# Patient Record
Sex: MALE | Race: WHITE | NOT HISPANIC OR LATINO | Employment: FULL TIME | URBAN - METROPOLITAN AREA
[De-identification: names, ages, dates, MRNs, and addresses within clinical notes are randomized per-mention and may not be internally consistent; named-entity substitution may affect disease eponyms.]

---

## 2017-01-17 ENCOUNTER — HISTORICAL (OUTPATIENT)
Dept: ADMINISTRATIVE | Facility: HOSPITAL | Age: 67
End: 2017-01-17

## 2017-06-27 ENCOUNTER — HISTORICAL (OUTPATIENT)
Dept: ADMINISTRATIVE | Facility: HOSPITAL | Age: 67
End: 2017-06-27

## 2017-12-18 ENCOUNTER — HISTORICAL (OUTPATIENT)
Dept: ADMINISTRATIVE | Facility: HOSPITAL | Age: 67
End: 2017-12-18

## 2018-01-23 LAB — CRC RECOMMENDATION EXT: NORMAL

## 2018-04-10 ENCOUNTER — HISTORICAL (OUTPATIENT)
Dept: ADMINISTRATIVE | Facility: HOSPITAL | Age: 68
End: 2018-04-10

## 2018-04-10 LAB
ABS NEUT (OLG): 3.71 X10(3)/MCL (ref 2.1–9.2)
ALBUMIN SERPL-MCNC: 3.4 GM/DL (ref 3.4–5)
ALBUMIN/GLOB SERPL: 0.8 RATIO (ref 1.1–2)
ALP SERPL-CCNC: 107 UNIT/L (ref 50–136)
ALT SERPL-CCNC: 45 UNIT/L (ref 12–78)
AST SERPL-CCNC: 22 UNIT/L (ref 15–37)
BASOPHILS # BLD AUTO: 0.1 X10(3)/MCL (ref 0–0.2)
BASOPHILS NFR BLD AUTO: 1 %
BILIRUB SERPL-MCNC: 0.4 MG/DL (ref 0.2–1)
BILIRUBIN DIRECT+TOT PNL SERPL-MCNC: 0.1 MG/DL (ref 0–0.5)
BILIRUBIN DIRECT+TOT PNL SERPL-MCNC: 0.3 MG/DL (ref 0–0.8)
BUN SERPL-MCNC: 16 MG/DL (ref 7–18)
CALCIUM SERPL-MCNC: 9.7 MG/DL (ref 8.5–10.1)
CHLORIDE SERPL-SCNC: 106 MMOL/L (ref 98–107)
CHOLEST SERPL-MCNC: 164 MG/DL (ref 0–200)
CHOLEST/HDLC SERPL: 4.7 {RATIO} (ref 0–5)
CO2 SERPL-SCNC: 28 MMOL/L (ref 21–32)
CREAT SERPL-MCNC: 1.03 MG/DL (ref 0.7–1.3)
DEPRECATED CALCIDIOL+CALCIFEROL SERPL-MC: 23 NG/ML (ref 30–80)
EOSINOPHIL # BLD AUTO: 0.6 X10(3)/MCL (ref 0–0.9)
EOSINOPHIL NFR BLD AUTO: 6 %
ERYTHROCYTE [DISTWIDTH] IN BLOOD BY AUTOMATED COUNT: 14.9 % (ref 11.5–17)
EST. AVERAGE GLUCOSE BLD GHB EST-MCNC: 114 MG/DL
GLOBULIN SER-MCNC: 4.4 GM/DL (ref 2.4–3.5)
GLUCOSE SERPL-MCNC: 122 MG/DL (ref 74–106)
HBA1C MFR BLD: 5.6 % (ref 4.2–6.3)
HCT VFR BLD AUTO: 46.2 % (ref 42–52)
HDLC SERPL-MCNC: 35 MG/DL (ref 35–60)
HGB BLD-MCNC: 15 GM/DL (ref 14–18)
LDLC SERPL CALC-MCNC: 101 MG/DL (ref 0–129)
LYMPHOCYTES # BLD AUTO: 4 X10(3)/MCL (ref 0.6–4.6)
LYMPHOCYTES NFR BLD AUTO: 44 %
MCH RBC QN AUTO: 28.6 PG (ref 27–31)
MCHC RBC AUTO-ENTMCNC: 32.5 GM/DL (ref 33–36)
MCV RBC AUTO: 88.2 FL (ref 80–94)
MONOCYTES # BLD AUTO: 0.8 X10(3)/MCL (ref 0.1–1.3)
MONOCYTES NFR BLD AUTO: 9 %
NEUTROPHILS # BLD AUTO: 3.71 X10(3)/MCL (ref 2.1–9.2)
NEUTROPHILS NFR BLD AUTO: 40 %
PLATELET # BLD AUTO: 257 X10(3)/MCL (ref 130–400)
PMV BLD AUTO: 8.9 FL (ref 9.4–12.4)
POTASSIUM SERPL-SCNC: 4.5 MMOL/L (ref 3.5–5.1)
PROT SERPL-MCNC: 7.8 GM/DL (ref 6.4–8.2)
PSA SERPL-MCNC: 0.87 NG/ML (ref 0–4)
RBC # BLD AUTO: 5.24 X10(6)/MCL (ref 4.7–6.1)
SODIUM SERPL-SCNC: 139 MMOL/L (ref 136–145)
TESTOST SERPL-MCNC: 1221 NG/DL
TRIGL SERPL-MCNC: 139 MG/DL (ref 30–150)
VLDLC SERPL CALC-MCNC: 28 MG/DL
WBC # SPEC AUTO: 9.2 X10(3)/MCL (ref 4.5–11.5)

## 2019-05-07 ENCOUNTER — HISTORICAL (OUTPATIENT)
Dept: ADMINISTRATIVE | Facility: HOSPITAL | Age: 69
End: 2019-05-07

## 2019-05-07 LAB
ABS NEUT (OLG): 5.02 X10(3)/MCL (ref 2.1–9.2)
BASOPHILS # BLD AUTO: 0 X10(3)/MCL (ref 0–0.2)
BASOPHILS NFR BLD AUTO: 0 %
BUN SERPL-MCNC: 12 MG/DL (ref 7–18)
CALCIUM SERPL-MCNC: 9.3 MG/DL (ref 8.5–10.1)
CHLORIDE SERPL-SCNC: 104 MMOL/L (ref 98–107)
CHOLEST SERPL-MCNC: 179 MG/DL (ref 0–200)
CHOLEST/HDLC SERPL: 3.5 {RATIO} (ref 0–5)
CO2 SERPL-SCNC: 27 MMOL/L (ref 21–32)
CREAT SERPL-MCNC: 1 MG/DL (ref 0.7–1.3)
CREAT/UREA NIT SERPL: 12
DEPRECATED CALCIDIOL+CALCIFEROL SERPL-MC: 59.31 NG/ML (ref 30–80)
EOSINOPHIL # BLD AUTO: 0.4 X10(3)/MCL (ref 0–0.9)
EOSINOPHIL NFR BLD AUTO: 5 %
ERYTHROCYTE [DISTWIDTH] IN BLOOD BY AUTOMATED COUNT: 14.6 % (ref 11.5–17)
EST. AVERAGE GLUCOSE BLD GHB EST-MCNC: 157 MG/DL
GLUCOSE SERPL-MCNC: 185 MG/DL (ref 74–106)
HBA1C MFR BLD: 7.1 % (ref 4.2–6.3)
HCT VFR BLD AUTO: 46.6 % (ref 42–52)
HDLC SERPL-MCNC: 51 MG/DL (ref 35–60)
HGB BLD-MCNC: 14.9 GM/DL (ref 14–18)
LDLC SERPL CALC-MCNC: 84 MG/DL (ref 0–129)
LYMPHOCYTES # BLD AUTO: 3.1 X10(3)/MCL (ref 0.6–4.6)
LYMPHOCYTES NFR BLD AUTO: 33 %
MCH RBC QN AUTO: 28.2 PG (ref 27–31)
MCHC RBC AUTO-ENTMCNC: 32 GM/DL (ref 33–36)
MCV RBC AUTO: 88.3 FL (ref 80–94)
MONOCYTES # BLD AUTO: 0.8 X10(3)/MCL (ref 0.1–1.3)
MONOCYTES NFR BLD AUTO: 8 %
NEUTROPHILS # BLD AUTO: 5.02 X10(3)/MCL (ref 2.1–9.2)
NEUTROPHILS NFR BLD AUTO: 53 %
PLATELET # BLD AUTO: 295 X10(3)/MCL (ref 130–400)
PMV BLD AUTO: 9 FL (ref 9.4–12.4)
POTASSIUM SERPL-SCNC: 4.3 MMOL/L (ref 3.5–5.1)
PSA SERPL-MCNC: 0.61 NG/ML (ref 0–4)
RBC # BLD AUTO: 5.28 X10(6)/MCL (ref 4.7–6.1)
SODIUM SERPL-SCNC: 139 MMOL/L (ref 136–145)
TESTOST SERPL-MCNC: 333 NG/DL (ref 241–827)
TRIGL SERPL-MCNC: 221 MG/DL (ref 30–150)
URATE SERPL-MCNC: 8 MG/DL (ref 2.6–7.2)
VLDLC SERPL CALC-MCNC: 44 MG/DL
WBC # SPEC AUTO: 9.4 X10(3)/MCL (ref 4.5–11.5)

## 2020-02-10 LAB
LEFT EYE DM RETINOPATHY: NEGATIVE
RIGHT EYE DM RETINOPATHY: NEGATIVE

## 2021-05-26 ENCOUNTER — HISTORICAL (OUTPATIENT)
Dept: ADMINISTRATIVE | Facility: HOSPITAL | Age: 71
End: 2021-05-26

## 2021-05-26 LAB
ALBUMIN SERPL-MCNC: 3.7 GM/DL (ref 3.4–4.8)
ALBUMIN/GLOB SERPL: 0.9 RATIO (ref 1.1–2)
ALP SERPL-CCNC: 102 UNIT/L (ref 40–150)
ALT SERPL-CCNC: 26 UNIT/L (ref 0–55)
AST SERPL-CCNC: 18 UNIT/L (ref 5–34)
BILIRUB SERPL-MCNC: 0.8 MG/DL
BILIRUBIN DIRECT+TOT PNL SERPL-MCNC: 0.3 MG/DL (ref 0–0.5)
BILIRUBIN DIRECT+TOT PNL SERPL-MCNC: 0.5 MG/DL (ref 0–0.8)
BUN SERPL-MCNC: 11 MG/DL (ref 8.4–25.7)
CALCIUM SERPL-MCNC: 10.5 MG/DL (ref 8.8–10)
CHLORIDE SERPL-SCNC: 99 MMOL/L (ref 98–107)
CHOLEST SERPL-MCNC: 168 MG/DL
CHOLEST/HDLC SERPL: 5 {RATIO} (ref 0–5)
CO2 SERPL-SCNC: 27 MMOL/L (ref 23–31)
CREAT SERPL-MCNC: 0.83 MG/DL (ref 0.73–1.18)
DEPRECATED CALCIDIOL+CALCIFEROL SERPL-MC: 18.5 NG/ML (ref 30–80)
ERYTHROCYTE [DISTWIDTH] IN BLOOD BY AUTOMATED COUNT: 15.2 % (ref 11.5–17)
EST. AVERAGE GLUCOSE BLD GHB EST-MCNC: 180 MG/DL
GLOBULIN SER-MCNC: 4 GM/DL (ref 2.4–3.5)
GLUCOSE SERPL-MCNC: 201 MG/DL (ref 82–115)
HBA1C MFR BLD: 7.9 %
HCT VFR BLD AUTO: 46 % (ref 42–52)
HDLC SERPL-MCNC: 37 MG/DL (ref 35–60)
HGB BLD-MCNC: 15.1 GM/DL (ref 14–18)
LDLC SERPL CALC-MCNC: 83 MG/DL (ref 50–140)
MCH RBC QN AUTO: 28.3 PG (ref 27–31)
MCHC RBC AUTO-ENTMCNC: 32.8 GM/DL (ref 33–36)
MCV RBC AUTO: 86.1 FL (ref 80–94)
PLATELET # BLD AUTO: 309 X10(3)/MCL (ref 130–400)
PMV BLD AUTO: 9.1 FL (ref 9.4–12.4)
POTASSIUM SERPL-SCNC: 4.3 MMOL/L (ref 3.5–5.1)
PROT SERPL-MCNC: 7.7 GM/DL (ref 5.8–7.6)
RBC # BLD AUTO: 5.34 X10(6)/MCL (ref 4.7–6.1)
SODIUM SERPL-SCNC: 141 MMOL/L (ref 136–145)
T3RU NFR SERPL: 30 % (ref 31–39)
T4 SERPL-MCNC: 8.55 UG/DL (ref 4.87–11.72)
TRIGL SERPL-MCNC: 242 MG/DL (ref 34–140)
TSH SERPL-ACNC: 0.97 UIU/ML (ref 0.35–4.94)
VIT B12 SERPL-MCNC: 494 PG/ML (ref 213–816)
VLDLC SERPL CALC-MCNC: 48 MG/DL
WBC # SPEC AUTO: 8.7 X10(3)/MCL (ref 4.5–11.5)

## 2021-07-09 ENCOUNTER — HISTORICAL (OUTPATIENT)
Dept: ADMINISTRATIVE | Facility: HOSPITAL | Age: 71
End: 2021-07-09

## 2021-07-09 LAB
ABS NEUT (OLG): 4.31 X10(3)/MCL (ref 2.1–9.2)
ALBUMIN SERPL-MCNC: 3.4 GM/DL (ref 3.4–4.8)
ALBUMIN/GLOB SERPL: 0.8 RATIO (ref 1.1–2)
ALP SERPL-CCNC: 96 UNIT/L (ref 40–150)
ALT SERPL-CCNC: 34 UNIT/L (ref 0–55)
AMYLASE SERPL-CCNC: 43 UNIT/L (ref 20–160)
APPEARANCE, UA: CLEAR
AST SERPL-CCNC: 28 UNIT/L (ref 5–34)
BACTERIA SPEC CULT: ABNORMAL /HPF
BASOPHILS # BLD AUTO: 0.1 X10(3)/MCL (ref 0–0.2)
BASOPHILS NFR BLD AUTO: 1 %
BILIRUB SERPL-MCNC: 0.7 MG/DL
BILIRUB UR QL STRIP: NEGATIVE
BILIRUBIN DIRECT+TOT PNL SERPL-MCNC: 0.3 MG/DL (ref 0–0.5)
BILIRUBIN DIRECT+TOT PNL SERPL-MCNC: 0.4 MG/DL (ref 0–0.8)
BUN SERPL-MCNC: 8.5 MG/DL (ref 8.4–25.7)
CALCIUM SERPL-MCNC: 9.3 MG/DL (ref 8.8–10)
CHLORIDE SERPL-SCNC: 105 MMOL/L (ref 98–107)
CO2 SERPL-SCNC: 23 MMOL/L (ref 23–31)
COLOR UR: YELLOW
CREAT SERPL-MCNC: 0.82 MG/DL (ref 0.73–1.18)
EOSINOPHIL # BLD AUTO: 0.4 X10(3)/MCL (ref 0–0.9)
EOSINOPHIL NFR BLD AUTO: 4 %
ERYTHROCYTE [DISTWIDTH] IN BLOOD BY AUTOMATED COUNT: 14 % (ref 11.5–17)
EST. AVERAGE GLUCOSE BLD GHB EST-MCNC: 205.9 MG/DL
GLOBULIN SER-MCNC: 4.2 GM/DL (ref 2.4–3.5)
GLUCOSE (UA): ABNORMAL
GLUCOSE SERPL-MCNC: 187 MG/DL (ref 82–115)
HBA1C MFR BLD: 8.8 %
HCT VFR BLD AUTO: 42.4 % (ref 42–52)
HGB BLD-MCNC: 13.7 GM/DL (ref 14–18)
HGB UR QL STRIP: NEGATIVE
KETONES UR QL STRIP: NEGATIVE
LEUKOCYTE ESTERASE UR QL STRIP: NEGATIVE
LYMPHOCYTES # BLD AUTO: 4.4 X10(3)/MCL (ref 0.6–4.6)
LYMPHOCYTES NFR BLD AUTO: 44 %
MCH RBC QN AUTO: 27.5 PG (ref 27–31)
MCHC RBC AUTO-ENTMCNC: 32.3 GM/DL (ref 33–36)
MCV RBC AUTO: 85 FL (ref 80–94)
MONOCYTES # BLD AUTO: 0.7 X10(3)/MCL (ref 0.1–1.3)
MONOCYTES NFR BLD AUTO: 7 %
NEUTROPHILS # BLD AUTO: 4.31 X10(3)/MCL (ref 2.1–9.2)
NEUTROPHILS NFR BLD AUTO: 44 %
NITRITE UR QL STRIP: NEGATIVE
PH UR STRIP: 5.5 [PH] (ref 5–9)
PLATELET # BLD AUTO: 286 X10(3)/MCL (ref 130–400)
PMV BLD AUTO: 9.4 FL (ref 9.4–12.4)
POTASSIUM SERPL-SCNC: 4 MMOL/L (ref 3.5–5.1)
PROT SERPL-MCNC: 7.6 GM/DL (ref 5.8–7.6)
PROT UR QL STRIP: ABNORMAL
RBC # BLD AUTO: 4.99 X10(6)/MCL (ref 4.7–6.1)
RBC #/AREA URNS HPF: ABNORMAL /[HPF]
SODIUM SERPL-SCNC: 141 MMOL/L (ref 136–145)
SP GR UR STRIP: 1.02 (ref 1–1.03)
SQUAMOUS EPITHELIAL, UA: ABNORMAL /HPF
UA WBC MAN: ABNORMAL
UROBILINOGEN UR STRIP-ACNC: 0.2
WBC # SPEC AUTO: 9.9 X10(3)/MCL (ref 4.5–11.5)

## 2022-08-02 DIAGNOSIS — R31.9 HEMATURIA SYNDROME: Primary | ICD-10-CM

## 2022-08-05 ENCOUNTER — HOSPITAL ENCOUNTER (OUTPATIENT)
Dept: RADIOLOGY | Facility: HOSPITAL | Age: 72
Discharge: HOME | End: 2022-08-05
Attending: UROLOGY
Payer: COMMERCIAL

## 2022-08-05 DIAGNOSIS — R31.9 HEMATURIA SYNDROME: ICD-10-CM

## 2022-08-05 LAB
CREAT SERPL-MCNC: 0.7 MG/DL (ref 0.5–1.4)
SAMPLE: NORMAL

## 2022-08-05 PROCEDURE — 25500020 PHARM REV CODE 255: Performed by: UROLOGY

## 2022-08-05 PROCEDURE — 74178 CT ABD&PLV WO CNTR FLWD CNTR: CPT | Mod: TC

## 2022-08-05 RX ADMIN — IOPAMIDOL 100 ML: 755 INJECTION, SOLUTION INTRAVENOUS at 11:08

## 2022-08-25 RX ORDER — ENALAPRIL MALEATE 10 MG/1
TABLET ORAL
Qty: 90 TABLET | Refills: 3 | Status: SHIPPED | OUTPATIENT
Start: 2022-08-25

## 2022-11-17 ENCOUNTER — TELEPHONE (OUTPATIENT)
Dept: INTERNAL MEDICINE | Facility: CLINIC | Age: 72
End: 2022-11-17
Payer: COMMERCIAL

## 2022-12-20 ENCOUNTER — DOCUMENTATION ONLY (OUTPATIENT)
Dept: ADMINISTRATIVE | Facility: HOSPITAL | Age: 72
End: 2022-12-20
Payer: COMMERCIAL

## 2022-12-27 ENCOUNTER — DOCUMENTATION ONLY (OUTPATIENT)
Dept: ADMINISTRATIVE | Facility: HOSPITAL | Age: 72
End: 2022-12-27
Payer: COMMERCIAL

## 2023-02-14 LAB — CRC RECOMMENDATION EXT: NORMAL

## 2023-03-21 ENCOUNTER — PATIENT OUTREACH (OUTPATIENT)
Dept: ADMINISTRATIVE | Facility: HOSPITAL | Age: 73
End: 2023-03-21
Payer: COMMERCIAL

## 2023-03-21 NOTE — PROGRESS NOTES
Population Health Outreach.   The following record(s)  below were uploaded for Health Maintenance .      COLONOSCOPY     02/14/23

## 2023-05-31 ENCOUNTER — PATIENT OUTREACH (OUTPATIENT)
Dept: ADMINISTRATIVE | Facility: HOSPITAL | Age: 73
End: 2023-05-31
Payer: COMMERCIAL

## 2023-05-31 NOTE — PROGRESS NOTES
Population Health Outreach.    Last eye exam on file is from 2020,called patient to discuss- no answer/left voicemail.

## 2023-08-04 ENCOUNTER — TELEPHONE (OUTPATIENT)
Dept: INTERNAL MEDICINE | Facility: CLINIC | Age: 73
End: 2023-08-04
Payer: COMMERCIAL

## 2023-09-05 ENCOUNTER — HOSPITAL ENCOUNTER (INPATIENT)
Facility: HOSPITAL | Age: 73
LOS: 1 days | Discharge: HOME | DRG: 694 | End: 2023-09-06
Attending: EMERGENCY MEDICINE | Admitting: INTERNAL MEDICINE
Payer: COMMERCIAL

## 2023-09-05 DIAGNOSIS — N20.1 URETEROLITHIASIS: Primary | ICD-10-CM

## 2023-09-05 DIAGNOSIS — R50.9 FEVER, UNSPECIFIED FEVER CAUSE: ICD-10-CM

## 2023-09-05 LAB
ALBUMIN SERPL-MCNC: 3.7 G/DL (ref 3.4–4.8)
ALBUMIN/GLOB SERPL: 0.8 RATIO (ref 1.1–2)
ALP SERPL-CCNC: 104 UNIT/L (ref 40–150)
ALT SERPL-CCNC: 28 UNIT/L (ref 0–55)
APPEARANCE UR: CLEAR
AST SERPL-CCNC: 18 UNIT/L (ref 5–34)
BACTERIA #/AREA URNS AUTO: ABNORMAL /HPF
BASOPHILS # BLD AUTO: 0.09 X10(3)/MCL
BASOPHILS NFR BLD AUTO: 0.6 %
BILIRUB SERPL-MCNC: 0.4 MG/DL
BILIRUB UR QL STRIP.AUTO: NEGATIVE
BUN SERPL-MCNC: 22.7 MG/DL (ref 8.4–25.7)
CALCIUM SERPL-MCNC: 9.7 MG/DL (ref 8.8–10)
CHLORIDE SERPL-SCNC: 103 MMOL/L (ref 98–107)
CO2 SERPL-SCNC: 24 MMOL/L (ref 23–31)
COLOR UR: YELLOW
CREAT SERPL-MCNC: 1.41 MG/DL (ref 0.73–1.18)
EOSINOPHIL # BLD AUTO: 0.15 X10(3)/MCL (ref 0–0.9)
EOSINOPHIL NFR BLD AUTO: 1 %
ERYTHROCYTE [DISTWIDTH] IN BLOOD BY AUTOMATED COUNT: 13.7 % (ref 11.5–17)
GFR SERPLBLD CREATININE-BSD FMLA CKD-EPI: 52 MLS/MIN/1.73/M2
GLOBULIN SER-MCNC: 4.4 GM/DL (ref 2.4–3.5)
GLUCOSE SERPL-MCNC: 347 MG/DL (ref 82–115)
GLUCOSE UR QL STRIP.AUTO: ABNORMAL
HCT VFR BLD AUTO: 45.1 % (ref 42–52)
HGB BLD-MCNC: 15 G/DL (ref 14–18)
IMM GRANULOCYTES # BLD AUTO: 0.05 X10(3)/MCL (ref 0–0.04)
IMM GRANULOCYTES NFR BLD AUTO: 0.3 %
KETONES UR QL STRIP.AUTO: NEGATIVE
LEUKOCYTE ESTERASE UR QL STRIP.AUTO: NEGATIVE
LIPASE SERPL-CCNC: 20 U/L
LYMPHOCYTES # BLD AUTO: 2.13 X10(3)/MCL (ref 0.6–4.6)
LYMPHOCYTES NFR BLD AUTO: 14.3 %
MCH RBC QN AUTO: 26.9 PG (ref 27–31)
MCHC RBC AUTO-ENTMCNC: 33.3 G/DL (ref 33–36)
MCV RBC AUTO: 81 FL (ref 80–94)
MONOCYTES # BLD AUTO: 0.88 X10(3)/MCL (ref 0.1–1.3)
MONOCYTES NFR BLD AUTO: 5.9 %
NEUTROPHILS # BLD AUTO: 11.56 X10(3)/MCL (ref 2.1–9.2)
NEUTROPHILS NFR BLD AUTO: 77.9 %
NITRITE UR QL STRIP.AUTO: NEGATIVE
NRBC BLD AUTO-RTO: 0 %
PH UR STRIP.AUTO: 5.5 [PH]
PLATELET # BLD AUTO: 311 X10(3)/MCL (ref 130–400)
PMV BLD AUTO: 8.7 FL (ref 7.4–10.4)
POCT GLUCOSE: 203 MG/DL (ref 70–110)
POTASSIUM SERPL-SCNC: 4.6 MMOL/L (ref 3.5–5.1)
PROT SERPL-MCNC: 8.1 GM/DL (ref 5.8–7.6)
PROT UR QL STRIP.AUTO: ABNORMAL
RBC # BLD AUTO: 5.57 X10(6)/MCL (ref 4.7–6.1)
RBC #/AREA URNS AUTO: ABNORMAL /HPF
RBC UR QL AUTO: ABNORMAL
SARS-COV-2 RDRP RESP QL NAA+PROBE: NEGATIVE
SODIUM SERPL-SCNC: 136 MMOL/L (ref 136–145)
SP GR UR STRIP.AUTO: 1.02 (ref 1–1.03)
SQUAMOUS #/AREA URNS AUTO: ABNORMAL /HPF
UROBILINOGEN UR STRIP-ACNC: 0.2
WBC # SPEC AUTO: 14.86 X10(3)/MCL (ref 4.5–11.5)
WBC #/AREA URNS AUTO: ABNORMAL /HPF

## 2023-09-05 PROCEDURE — 87040 BLOOD CULTURE FOR BACTERIA: CPT

## 2023-09-05 PROCEDURE — 25000003 PHARM REV CODE 250

## 2023-09-05 PROCEDURE — 63600175 PHARM REV CODE 636 W HCPCS: Performed by: EMERGENCY MEDICINE

## 2023-09-05 PROCEDURE — 63600175 PHARM REV CODE 636 W HCPCS

## 2023-09-05 PROCEDURE — 96361 HYDRATE IV INFUSION ADD-ON: CPT

## 2023-09-05 PROCEDURE — 96375 TX/PRO/DX INJ NEW DRUG ADDON: CPT

## 2023-09-05 PROCEDURE — 96374 THER/PROPH/DIAG INJ IV PUSH: CPT

## 2023-09-05 PROCEDURE — 25000003 PHARM REV CODE 250: Performed by: EMERGENCY MEDICINE

## 2023-09-05 PROCEDURE — 83690 ASSAY OF LIPASE: CPT | Performed by: EMERGENCY MEDICINE

## 2023-09-05 PROCEDURE — 85025 COMPLETE CBC W/AUTO DIFF WBC: CPT | Performed by: STUDENT IN AN ORGANIZED HEALTH CARE EDUCATION/TRAINING PROGRAM

## 2023-09-05 PROCEDURE — 81001 URINALYSIS AUTO W/SCOPE: CPT | Performed by: STUDENT IN AN ORGANIZED HEALTH CARE EDUCATION/TRAINING PROGRAM

## 2023-09-05 PROCEDURE — 87088 URINE BACTERIA CULTURE: CPT | Performed by: NURSE PRACTITIONER

## 2023-09-05 PROCEDURE — 87635 SARS-COV-2 COVID-19 AMP PRB: CPT | Performed by: EMERGENCY MEDICINE

## 2023-09-05 PROCEDURE — 80053 COMPREHEN METABOLIC PANEL: CPT | Performed by: STUDENT IN AN ORGANIZED HEALTH CARE EDUCATION/TRAINING PROGRAM

## 2023-09-05 PROCEDURE — 11000001 HC ACUTE MED/SURG PRIVATE ROOM

## 2023-09-05 PROCEDURE — 99285 EMERGENCY DEPT VISIT HI MDM: CPT | Mod: 25

## 2023-09-05 RX ORDER — MIDAZOLAM HYDROCHLORIDE 1 MG/ML
2 INJECTION INTRAMUSCULAR; INTRAVENOUS ONCE AS NEEDED
Status: CANCELLED | OUTPATIENT
Start: 2023-09-05 | End: 2035-02-01

## 2023-09-05 RX ORDER — LAMOTRIGINE 100 MG/1
100 TABLET ORAL DAILY
Status: DISCONTINUED | OUTPATIENT
Start: 2023-09-05 | End: 2023-09-06 | Stop reason: HOSPADM

## 2023-09-05 RX ORDER — METFORMIN HYDROCHLORIDE 500 MG/1
500 TABLET ORAL 2 TIMES DAILY WITH MEALS
COMMUNITY

## 2023-09-05 RX ORDER — INSULIN ASPART 100 [IU]/ML
0-5 INJECTION, SOLUTION INTRAVENOUS; SUBCUTANEOUS
Status: DISCONTINUED | OUTPATIENT
Start: 2023-09-05 | End: 2023-09-06 | Stop reason: HOSPADM

## 2023-09-05 RX ORDER — GLUCAGON 1 MG
1 KIT INJECTION
Status: DISCONTINUED | OUTPATIENT
Start: 2023-09-05 | End: 2023-09-06 | Stop reason: HOSPADM

## 2023-09-05 RX ORDER — HYDROCODONE BITARTRATE AND ACETAMINOPHEN 5; 325 MG/1; MG/1
1 TABLET ORAL
Status: CANCELLED | OUTPATIENT
Start: 2023-09-05

## 2023-09-05 RX ORDER — ROSUVASTATIN CALCIUM 20 MG/1
20 TABLET, COATED ORAL DAILY
COMMUNITY

## 2023-09-05 RX ORDER — OLANZAPINE 5 MG/1
5 TABLET ORAL
COMMUNITY
Start: 2023-08-18

## 2023-09-05 RX ORDER — ONDANSETRON 2 MG/ML
4 INJECTION INTRAMUSCULAR; INTRAVENOUS
Status: COMPLETED | OUTPATIENT
Start: 2023-09-05 | End: 2023-09-05

## 2023-09-05 RX ORDER — ASPIRIN 81 MG/1
81 TABLET ORAL DAILY
COMMUNITY

## 2023-09-05 RX ORDER — PROCHLORPERAZINE EDISYLATE 5 MG/ML
5 INJECTION INTRAMUSCULAR; INTRAVENOUS EVERY 30 MIN PRN
Status: CANCELLED | OUTPATIENT
Start: 2023-09-05

## 2023-09-05 RX ORDER — ONDANSETRON 2 MG/ML
4 INJECTION INTRAMUSCULAR; INTRAVENOUS DAILY PRN
Status: CANCELLED | OUTPATIENT
Start: 2023-09-05

## 2023-09-05 RX ORDER — ONDANSETRON 2 MG/ML
4 INJECTION INTRAMUSCULAR; INTRAVENOUS EVERY 8 HOURS PRN
Status: DISCONTINUED | OUTPATIENT
Start: 2023-09-05 | End: 2023-09-06 | Stop reason: HOSPADM

## 2023-09-05 RX ORDER — SODIUM CHLORIDE 9 MG/ML
1000 INJECTION, SOLUTION INTRAVENOUS
Status: COMPLETED | OUTPATIENT
Start: 2023-09-05 | End: 2023-09-05

## 2023-09-05 RX ORDER — SODIUM CHLORIDE, SODIUM GLUCONATE, SODIUM ACETATE, POTASSIUM CHLORIDE AND MAGNESIUM CHLORIDE 30; 37; 368; 526; 502 MG/100ML; MG/100ML; MG/100ML; MG/100ML; MG/100ML
INJECTION, SOLUTION INTRAVENOUS CONTINUOUS
Status: CANCELLED | OUTPATIENT
Start: 2023-09-05 | End: 2023-10-05

## 2023-09-05 RX ORDER — KETOROLAC TROMETHAMINE 30 MG/ML
15 INJECTION, SOLUTION INTRAMUSCULAR; INTRAVENOUS
Status: COMPLETED | OUTPATIENT
Start: 2023-09-05 | End: 2023-09-05

## 2023-09-05 RX ORDER — GLIPIZIDE 10 MG/1
10 TABLET ORAL
COMMUNITY

## 2023-09-05 RX ORDER — HYDROMORPHONE HYDROCHLORIDE 2 MG/ML
0.4 INJECTION, SOLUTION INTRAMUSCULAR; INTRAVENOUS; SUBCUTANEOUS EVERY 5 MIN PRN
Status: CANCELLED | OUTPATIENT
Start: 2023-09-05

## 2023-09-05 RX ORDER — LAMOTRIGINE 100 MG/1
100 TABLET ORAL DAILY
COMMUNITY
Start: 2023-07-07

## 2023-09-05 RX ORDER — SODIUM CHLORIDE, SODIUM LACTATE, POTASSIUM CHLORIDE, CALCIUM CHLORIDE 600; 310; 30; 20 MG/100ML; MG/100ML; MG/100ML; MG/100ML
INJECTION, SOLUTION INTRAVENOUS CONTINUOUS
Status: CANCELLED | OUTPATIENT
Start: 2023-09-05

## 2023-09-05 RX ORDER — IBUPROFEN 200 MG
16 TABLET ORAL
Status: DISCONTINUED | OUTPATIENT
Start: 2023-09-05 | End: 2023-09-06 | Stop reason: HOSPADM

## 2023-09-05 RX ORDER — FOLIC ACID 1 MG/1
1 TABLET ORAL DAILY
COMMUNITY

## 2023-09-05 RX ORDER — OLANZAPINE 5 MG/1
5 TABLET ORAL DAILY
Status: DISCONTINUED | OUTPATIENT
Start: 2023-09-05 | End: 2023-09-06 | Stop reason: HOSPADM

## 2023-09-05 RX ORDER — MEPERIDINE HYDROCHLORIDE 25 MG/ML
6.25 INJECTION INTRAMUSCULAR; INTRAVENOUS; SUBCUTANEOUS ONCE AS NEEDED
Status: CANCELLED | OUTPATIENT
Start: 2023-09-05 | End: 2023-09-06

## 2023-09-05 RX ORDER — ACETAMINOPHEN 325 MG/1
650 TABLET ORAL EVERY 4 HOURS PRN
Status: DISCONTINUED | OUTPATIENT
Start: 2023-09-05 | End: 2023-09-06 | Stop reason: HOSPADM

## 2023-09-05 RX ORDER — ACETAMINOPHEN 325 MG/1
650 TABLET ORAL EVERY 8 HOURS PRN
Status: DISCONTINUED | OUTPATIENT
Start: 2023-09-05 | End: 2023-09-06 | Stop reason: HOSPADM

## 2023-09-05 RX ORDER — IBUPROFEN 200 MG
24 TABLET ORAL
Status: DISCONTINUED | OUTPATIENT
Start: 2023-09-05 | End: 2023-09-06 | Stop reason: HOSPADM

## 2023-09-05 RX ORDER — MORPHINE SULFATE 4 MG/ML
4 INJECTION, SOLUTION INTRAMUSCULAR; INTRAVENOUS
Status: COMPLETED | OUTPATIENT
Start: 2023-09-05 | End: 2023-09-05

## 2023-09-05 RX ORDER — OXYCODONE HYDROCHLORIDE 5 MG/1
5 TABLET ORAL EVERY 4 HOURS PRN
Status: CANCELLED | OUTPATIENT
Start: 2023-09-05

## 2023-09-05 RX ADMIN — OLANZAPINE 5 MG: 5 TABLET, FILM COATED ORAL at 01:09

## 2023-09-05 RX ADMIN — ONDANSETRON 4 MG: 2 INJECTION INTRAMUSCULAR; INTRAVENOUS at 08:09

## 2023-09-05 RX ADMIN — CEFTRIAXONE SODIUM 1 G: 1 INJECTION, POWDER, FOR SOLUTION INTRAMUSCULAR; INTRAVENOUS at 09:09

## 2023-09-05 RX ADMIN — LAMOTRIGINE 100 MG: 100 TABLET ORAL at 01:09

## 2023-09-05 RX ADMIN — INSULIN ASPART 3 UNITS: 100 INJECTION, SOLUTION INTRAVENOUS; SUBCUTANEOUS at 02:09

## 2023-09-05 RX ADMIN — KETOROLAC TROMETHAMINE 15 MG: 30 INJECTION, SOLUTION INTRAMUSCULAR; INTRAVENOUS at 08:09

## 2023-09-05 RX ADMIN — MORPHINE SULFATE 4 MG: 4 INJECTION, SOLUTION INTRAMUSCULAR; INTRAVENOUS at 08:09

## 2023-09-05 RX ADMIN — SODIUM CHLORIDE 1000 ML: 9 INJECTION, SOLUTION INTRAVENOUS at 08:09

## 2023-09-05 RX ADMIN — SODIUM CHLORIDE 1000 ML: 9 INJECTION, SOLUTION INTRAVENOUS at 10:09

## 2023-09-05 RX ADMIN — INSULIN HUMAN 10 UNITS: 100 INJECTION, SOLUTION PARENTERAL at 09:09

## 2023-09-05 NOTE — H&P
"[facility] MEDICINE - H&P ADMISSION NOTE      Patient Name: Sam Strong IV  MRN: [id number]  Patient Class: IP- Inpatient   Admission Date: 9/5/2023   Admitting Physician:  Service   Attending Physician: Sushant Garibay MD  Primary Care Provider: Jai Ren MD  Face-to-Face encounter date: 09/05/2023        CHIEF COMPLAINT     Chief Complaint   Patient presents with    Back Pain    Vomiting     Left lower back pain for mos, worse last night. had mri done that showed " a bulge", referred to Dr. Frias and scheduled to see on 9/27. Denies recent trauma/incontinence. @ episode of vomiting earlier       HISTORY OF PRESENTING ILLNESS   73-year-old male with a past medical history of HLD, HTN, dm 2, bipolar disorder, mild cognitive impairment versus Alzheimer dementia.  Coming in with complaints of left lower quadrant abdominal pain/flank pain he did have fever when he came in but did not feel that he was febrile.  Nausea and vomiting once.  Urology was consulted and planning for ureteral stent today due to CT abdomen pelvis noting mm stone at the left UVJ producing left mild hydronephrosis and hydroureter.        PAST MEDICAL HISTORY     Past Medical History:   Diagnosis Date    Personal history of colonic polyps        PAST SURGICAL HISTORY     Past Surgical History:   Procedure Laterality Date    COLONOSCOPY  01/23/2018    COLONOSCOPY  12/30/2014    COLONOSCOPY  02/14/2023       FAMILY HISTORY   Reviewed and noncontributory to this case    SOCIAL HISTORY     Social History     Socioeconomic History    Marital status:        HOME MEDICATIONS     Prior to Admission medications    Medication Sig Start Date End Date Taking? Authorizing Provider   allopurinoL (ZYLOPRIM) 300 MG tablet TAKE 1 TABLET BY MOUTH EVERY DAY 1/31/23   Jai Ren MD   enalapril (VASOTEC) 10 MG tablet TAKE 1 TABLET BY MOUTH EVERY DAY 8/25/22   Jai Ren MD   lamoTRIgine " (LAMICTAL) 100 MG tablet Take 100 mg by mouth once daily. 7/7/23   Provider, Historical   OLANZapine (ZYPREXA) 5 MG tablet Take 5 mg by mouth. 8/18/23   Provider, Historical       ALLERGIES   Iodine          REVIEW OF SYSTEMS   Except as documented above, all other systems reviewed and negative    PHYSICAL EXAM     Vitals:    09/05/23 1110   BP: 122/84   Pulse: 93   Resp:    Temp:       General:  In no acute distress, resting comfortably  Head and neck:  Atraumatic, normocephalic, moist mucous membranes, supple neck  Chest:  Clear to auscultation bilaterally  Heart:  S1, S2, no appreciable murmur  Abdomen:  Soft, nontender, BS +  MSK:  Warm, no lower extremity edema, no clubbing or cyanosis  Neuro:  Alert and oriented x4, moving all extremities with good strength  Integumentary:  No obvious skin rash  Psychiatry:  Appropriate mood and affect          ASSESSMENT AND PLAN   Left UVJ obstructing ureterolithiasis  Acute complicated urinary tract infection   Sepsis 2/2 above   Acute kidney injury versus baseline CKD     History of: As listed above      Urology consulted and planning for stent placement today.    Urine to send for culture.  Continue Rocephin 1 g daily.      DVT prophylaxis:    __________________________________________________________________  LABS/MICRO/MEDS/DIAGNOSTICS       LABS  Recent Labs     09/05/23  0802      K 4.6   CHLORIDE 103   CO2 24   BUN 22.7   CREATININE 1.41*   GLUCOSE 347*   CALCIUM 9.7   ALKPHOS 104   AST 18   ALT 28   ALBUMIN 3.7     Recent Labs     09/05/23  0802   WBC 14.86*   RBC 5.57   HCT 45.1   MCV 81.0          MICROBIOLOGY  Microbiology Results (last 7 days)       Procedure Component Value Units Date/Time    Blood Culture [[id number]]     Order Status: Sent Specimen: Blood     Blood Culture [[id number]]     Order Status: Sent Specimen: Blood     Urine Culture High Risk [[id number]]     Order Status: Sent Specimen: Urine, Clean Catch             MEDICATIONS    [START ON 9/6/2023] cefTRIAXone (ROCEPHIN) IVPB  1 g Intravenous Q24H    lamoTRIgine  100 mg Oral Daily    OLANZapine  5 mg Oral Daily      INFUSIONS      DIAGNOSTIC TESTS  X-Ray Abdomen Flat And Erect   Final Result      No stone is identified in the left hemipelvis radiographically         Electronically signed by: Jose J Goddard MD   Date:    09/05/2023   Time:    11:30      CT Abdomen Pelvis  Without Contrast   Final Result      4 mm stone at the left UVJ producing mild left hydronephrosis and hydroureter.         Electronically signed by: Eugene Sidhu   Date:    09/05/2023   Time:    08:50             Patient information was obtained from patient, patient's family, past medical records and ER records.   All diagnosis and differential diagnosis have been reviewed; assessment and plan has been documented. I have personally reviewed the labs and test results that are presently available; I have reviewed the patients medication list. I have reviewed the consulting providers response and recommendations. I have reviewed or attempted to review medical records based upon their availability.  All of the patient's questions have been addressed and answered. Patient's is agreeable to the above stated plan. I will continue to monitor closely and make adjustments to medical management as needed.  This note was created using [employer] [place] voice recognition software that occasionally misinterpreted phrases or words.  Please contact me if any questions may rise regarding documentation to clarify verbiage.        Sushant Garibay MD   Internal Medicine  Department of [facility]  [facility] - Emergency Dept

## 2023-09-05 NOTE — CONSULTS
Sam Strong IV 1950  [id number]  9/5/2023    CONSULTING PHYSICIAN: [place]    Reason for consult: Ureteral stone, fever    HPI: Mr. Strong is a 72 yo male with a PMH of DM and chronic back pain presented to the ED with complaints of worsening lower back pain.  Denies fever, chills, gross hematuria, dysuria, nausea or vomiting.  He did have a temp of 100.7° on arrival.  Labs on arrival include WBC 14.86, BUN/creat 22.7 & 1.41, UA with no WBC or bacteria. Abd/pelvic CT revealed a 4 mm stone at the left UVJ with mild left hydronephrosis and hydroureter. He is a patient of Dr. Baljeet Dai.      Past Medical History:   Diagnosis Date    Personal history of colonic polyps      Past Surgical History:   Procedure Laterality Date    COLONOSCOPY  01/23/2018    COLONOSCOPY  12/30/2014    COLONOSCOPY  02/14/2023     No family history on file.    Current Facility-Administered Medications   Medication Dose Route Frequency Provider Last Rate Last Admin    0.9%  NaCl infusion  1,000 mL Intravenous ED 1 Time Jai Koch MD        cefTRIAXone (ROCEPHIN) 1 g in dextrose 5 % in water (D5W) 100 mL IVPB (MB+)  1 g Intravenous ED 1 Time Jai Koch  mL/hr at 09/05/23 0918 1 g at 09/05/23 0918     Current Outpatient Medications   Medication Sig Dispense Refill    allopurinoL (ZYLOPRIM) 300 MG tablet TAKE 1 TABLET BY MOUTH EVERY DAY 30 tablet 0    enalapril (VASOTEC) 10 MG tablet TAKE 1 TABLET BY MOUTH EVERY DAY 90 tablet 3     Review of patient's allergies indicates:   Allergen Reactions    Iodine      ROS: 12 point review of systems negative other than the HPI    PHYSICAL EXAM:  Vitals:    09/05/23 0750 09/05/23 0815   BP: (!) 175/95    BP Location: Left arm    Patient Position: Sitting    Pulse: 91    Resp: 18 20   Temp: (!) 100.7 °F (38.2 °C)    TempSrc: Oral    SpO2: 98%      No intake or output data in the 24 hours ending 09/05/23 0927    GEN: WN/WD NAD  HEENT: NCAT, PERRLA, EOMI, OP clear, nares patent  CV:  RRR  RESP: Even and unlabored  ABD: obese, soft, NTND  : no CVA tenderness  EXT: no C/C/E  NEURO: no focal deficits, MAEW, AAOx4      LABS:  Recent Results (from the past 24 hour(s))   Comprehensive metabolic panel    Collection Time: 09/05/23  8:02 AM   Result Value Ref Range    Sodium Level 136 136 - 145 mmol/L    Potassium Level 4.6 3.5 - 5.1 mmol/L    Chloride 103 98 - 107 mmol/L    Carbon Dioxide 24 23 - 31 mmol/L    Glucose Level 347 (H) 82 - 115 mg/dL    Blood Urea Nitrogen 22.7 8.4 - 25.7 mg/dL    Creatinine 1.41 (H) 0.73 - 1.18 mg/dL    Calcium Level Total 9.7 8.8 - 10.0 mg/dL    Protein Total 8.1 (H) 5.8 - 7.6 gm/dL    Albumin Level 3.7 3.4 - 4.8 g/dL    Globulin 4.4 (H) 2.4 - 3.5 gm/dL    Albumin/Globulin Ratio 0.8 (L) 1.1 - 2.0 ratio    Bilirubin Total 0.4 <=1.5 mg/dL    Alkaline Phosphatase 104 40 - 150 unit/L    Alanine Aminotransferase 28 0 - 55 unit/L    Aspartate Aminotransferase 18 5 - 34 unit/L    eGFR 52 mls/min/1.73/m2   CBC with Differential    Collection Time: 09/05/23  8:02 AM   Result Value Ref Range    WBC 14.86 (H) 4.50 - 11.50 x10(3)/mcL    RBC 5.57 4.70 - 6.10 x10(6)/mcL    Hgb 15.0 14.0 - 18.0 g/dL    Hct 45.1 42.0 - 52.0 %    MCV 81.0 80.0 - 94.0 fL    MCH 26.9 (L) 27.0 - 31.0 pg    MCHC 33.3 33.0 - 36.0 g/dL    RDW 13.7 11.5 - 17.0 %    Platelet 311 130 - 400 x10(3)/mcL    MPV 8.7 7.4 - 10.4 fL    Neut % 77.9 %    Lymph % 14.3 %    Mono % 5.9 %    Eos % 1.0 %    Basophil % 0.6 %    Lymph # 2.13 0.6 - 4.6 x10(3)/mcL    Neut # 11.56 (H) 2.1 - 9.2 x10(3)/mcL    Mono # 0.88 0.1 - 1.3 x10(3)/mcL    Eos # 0.15 0 - 0.9 x10(3)/mcL    Baso # 0.09 <=0.2 x10(3)/mcL    IG# 0.05 (H) 0 - 0.04 x10(3)/mcL    IG% 0.3 %    NRBC% 0.0 %   Lipase    Collection Time: 09/05/23  8:02 AM   Result Value Ref Range    Lipase Level 20 <=60 U/L   Urinalysis, Reflex to Urine Culture    Collection Time: 09/05/23  8:12 AM    Specimen: Urine   Result Value Ref Range    Color, UA Yellow Yellow, Light-Yellow, Dark  Yellow, Tarah, Straw    Appearance, UA Clear Clear    Specific Gravity, UA 1.019 1.005 - 1.030    pH, UA 5.5 5.0 - 8.5    Protein, UA 2+ (A) Negative    Glucose, UA 3+ (A) Negative, Normal    Ketones, UA Negative Negative    Blood, UA 2+ (A) Negative    Bilirubin, UA Negative Negative    Urobilinogen, UA 0.2 0.2, 1.0, Normal    Nitrites, UA Negative Negative    Leukocyte Esterase, UA Negative Negative   COVID-19 Rapid Screening    Collection Time: 09/05/23  8:12 AM   Result Value Ref Range    SARS COV-2 MOLECULAR Negative Negative   Urinalysis, Microscopic    Collection Time: 09/05/23  8:12 AM   Result Value Ref Range    RBC, UA 21-50 (A) None Seen, 0-2, 3-5, 0-5 /HPF    WBC, UA 0-5 None Seen, 0-2, 3-5, 0-5 /HPF    Squamous Epithelial Cells, UA 0-4 0-4, None Seen /HPF    Bacteria, UA None Seen None Seen, Rare, Occasional /HPF         IMAGING:  EXAMINATION:   CT ABDOMEN PELVIS WITHOUT CONTRAST     CLINICAL HISTORY:   Flank pain, kidney stone suspected;     TECHNIQUE:   CT imaging of the abdomen and pelvis without intravenous contrast. Axial, coronal and sagittal reformatted images reviewed. Dose length product is 632 mGycm. Automatic exposure control, adjustment of mA/kV or iterative reconstruction technique used to limit radiation dose.     COMPARISON:   CT 08/05/2022     FINDINGS:   Assessment of the visceral organs and vasculature is limited by the lack of IV contrast.     Liver/biliary: Hepatomegaly measures 20 cm.  Prior cholecystectomy. No biliary dilatation.     Pancreas: Normal.     Spleen: Normal.     Adrenals: Normal.     Genitourinary: 4 mm stone at the left UVJ.  It results in mild left hydronephrosis and hydroureter with left renal edema.  Few small nonobstructing renal stones bilaterally.  No collecting system dilatation on the right.  Several right renal cysts.  These measure up to 9 cm.  These cysts need no dedicated imaging follow-up by consensus recommendation.  Bladder within normal limits.      Stomach/bowel: No bowel obstruction. Normal appendix. No discernible bowel inflammation.     Lymph nodes and peritoneum: No pathologically enlarged lymph node identified with noncontrast technique. No ascites or free air.     Vasculature: Mild aortic and iliac artery calcifications.  No abdominal aortic aneurysm.     Abdominal wall: Normal.     Lung bases: Stable mild left lung base scarring.     Bones: No acute osseous findings.    Impression:       4 mm stone at the left UVJ producing mild left hydronephrosis and hydroureter.       Electronically signed by: Eugene Sidhu   Date: 09/05/2023   Time: 08:50         ASSESSMENT:  CT reviewed with mild left hydro uretero nephrosis secondary to a stone at the UVJ.  Repeat KUB with no evidence of stone.      PLAN:  Patient was evaluated and reported pain was resolved.  KUB obtained and we were unable to see the previous stone from CT near the ureteral orifice.  Would like to keep him in overnight monitor his vital signs recheck some labs in the morning and go from there.  If he spikes a temp overnight and does not continue to improve clinically we will elect to take him for cystoscopy with left ureteral stent placement tomorrow.  Otherwise continue empiric antibiotics, IV fluids, pain control and supportive care. Will add urine culture.    JOSE Dorado

## 2023-09-05 NOTE — ED PROVIDER NOTES
"Encounter Date: 9/5/2023    SCRIBE #1 NOTE: I, Martinez Watkins, am scribing for, and in the presence of,  Dr. Koch. I have scribed the following portions of the note - Other sections scribed: HPI, ROS, Physical Exam, MDM, Attending.       History     Chief Complaint   Patient presents with    Back Pain    Vomiting     Left lower back pain for mos, worse last night. had mri done that showed " a bulge", referred to Dr. Frias and scheduled to see on 9/27. Denies recent trauma/incontinence. @ episode of vomiting earlier     72 y/o male with history of kidney stones presents to ED for L lower back pain onset months ago, worsening last night.  Pt also complains of nausea and an episode of vomiting.  He had MRI with Dr. Borja about 2 weeks ago, which showed "a bulge", so pt was referred to Dr. Frias.  He has appointment on 9/27.  Pt has not been taking pain meds at home.  He denies any recent trauma, fever, chills, incontinence, cough, SOB, chest pain or abdominal pain.    Primary care physician- Dr JAS Borja    Urologist- Dr. Baljeet Dai    The history is provided by the patient and a relative.     Review of patient's allergies indicates:   Allergen Reactions    Iodine      Past Medical History:   Diagnosis Date    Personal history of colonic polyps      Past Surgical History:   Procedure Laterality Date    COLONOSCOPY  01/23/2018    COLONOSCOPY  12/30/2014    COLONOSCOPY  02/14/2023     No family history on file.     Review of Systems   Constitutional:  Negative for chills and fever.   Respiratory:  Negative for cough and shortness of breath.    Cardiovascular:  Negative for chest pain.   Gastrointestinal:  Positive for nausea and vomiting. Negative for abdominal pain.   Musculoskeletal:  Positive for back pain.   Neurological:         Denies incontinence        Physical Exam     Initial Vitals [09/05/23 0750]   BP Pulse Resp Temp SpO2   (!) 175/95 91 18 (!) 100.7 °F (38.2 °C) 98 %      MAP       --         Physical " Exam    Nursing note and vitals reviewed.  Constitutional: No distress.   HENT:   Head: Normocephalic and atraumatic.   Eyes: EOM are normal.   Neck: Trachea normal. Neck supple.   Normal range of motion.  Cardiovascular:  Normal rate and regular rhythm.           No murmur heard.  Pulmonary/Chest: Breath sounds normal. No respiratory distress.   Abdominal: Abdomen is soft. Bowel sounds are normal. He exhibits no distension. There is no abdominal tenderness. There is no rebound and no guarding.   Musculoskeletal:         General: Tenderness (mild L lower lumbar (lateral to spine)) present. Normal range of motion.      Cervical back: Normal range of motion and neck supple.      Lumbar back: Normal.     Neurological: He is alert and oriented to person, place, and time. He has normal strength.   Skin: Skin is warm and dry. No rash noted.   Psychiatric: He has a normal mood and affect.         ED Course   Procedures  Labs Reviewed   COMPREHENSIVE METABOLIC PANEL - Abnormal; Notable for the following components:       Result Value    Glucose Level 347 (*)     Creatinine 1.41 (*)     Protein Total 8.1 (*)     Globulin 4.4 (*)     Albumin/Globulin Ratio 0.8 (*)     All other components within normal limits   URINALYSIS, REFLEX TO URINE CULTURE - Abnormal; Notable for the following components:    Protein, UA 2+ (*)     Glucose, UA 3+ (*)     Blood, UA 2+ (*)     All other components within normal limits   CBC WITH DIFFERENTIAL - Abnormal; Notable for the following components:    WBC 14.86 (*)     MCH 26.9 (*)     Neut # 11.56 (*)     IG# 0.05 (*)     All other components within normal limits   URINALYSIS, MICROSCOPIC - Abnormal; Notable for the following components:    RBC, UA 21-50 (*)     All other components within normal limits   LIPASE - Normal   SARS-COV-2 RNA AMPLIFICATION, QUAL - Normal    Narrative:     The IDNOW COVID-19 assay is a rapid molecular in vitro diagnostic test utilizing an isothermal nucleic acid  amplification technology intended for the qualitative detection of nucleic acid from the SARS-CoV-2 viral RNA in direct nasal, nasopharyngeal or throat swabs from individuals who are suspected of COVID-19 by their healthcare provider.   CBC W/ AUTO DIFFERENTIAL    Narrative:     The following orders were created for panel order CBC auto differential.  Procedure                               Abnormality         Status                     ---------                               -----------         ------                     CBC with Differential[[id number]]       Abnormal            Final result                 Please view results for these tests on the individual orders.          Imaging Results              CT Abdomen Pelvis  Without Contrast (Final result)  Result time 09/05/23 08:50:19      Final result by Eugene Sidhu MD (09/05/23 08:50:19)                   Impression:      4 mm stone at the left UVJ producing mild left hydronephrosis and hydroureter.      Electronically signed by: Eugene Sidhu  Date:    09/05/2023  Time:    08:50               Narrative:    EXAMINATION:  CT ABDOMEN PELVIS WITHOUT CONTRAST    CLINICAL HISTORY:  Flank pain, kidney stone suspected;    TECHNIQUE:  CT imaging of the abdomen and pelvis without intravenous contrast. Axial, coronal and sagittal reformatted images reviewed. Dose length product is 632 mGycm. Automatic exposure control, adjustment of mA/kV or iterative reconstruction technique used to limit radiation dose.    COMPARISON:  CT 08/05/2022    FINDINGS:  Assessment of the visceral organs and vasculature is limited by the lack of IV contrast.    Liver/biliary: Hepatomegaly measures 20 cm.  Prior cholecystectomy. No biliary dilatation.    Pancreas: Normal.    Spleen: Normal.    Adrenals: Normal.    Genitourinary: 4 mm stone at the left UVJ.  It results in mild left hydronephrosis and hydroureter with left renal edema.  Few small nonobstructing renal stones bilaterally.  No  collecting system dilatation on the right.  Several right renal cysts.  These measure up to 9 cm.  These cysts need no dedicated imaging follow-up by consensus recommendation.  Bladder within normal limits.    Stomach/bowel: No bowel obstruction. Normal appendix. No discernible bowel inflammation.    Lymph nodes and peritoneum: No pathologically enlarged lymph node identified with noncontrast technique. No ascites or free air.    Vasculature: Mild aortic and iliac artery calcifications.  No abdominal aortic aneurysm.    Abdominal wall: Normal.    Lung bases: Stable mild left lung base scarring.    Bones: No acute osseous findings.                                       Medications   cefTRIAXone (ROCEPHIN) 1 g in dextrose 5 % in water (D5W) 100 mL IVPB (MB+) (1 g Intravenous New Bag 9/5/23 0918)   0.9%  NaCl infusion (has no administration in time range)   ondansetron injection 4 mg (has no administration in time range)   acetaminophen tablet 650 mg (has no administration in time range)   acetaminophen tablet 650 mg (has no administration in time range)   cefTRIAXone (ROCEPHIN) 1 g in dextrose 5 % in water (D5W) 100 mL IVPB (MB+) (has no administration in time range)   sodium chloride 0.9% bolus 1,000 mL 1,000 mL (0 mLs Intravenous Stopped 9/5/23 0915)   morphine injection 4 mg (4 mg Intravenous Given 9/5/23 0815)   ketorolac injection 15 mg (15 mg Intravenous Given 9/5/23 0815)   ondansetron injection 4 mg (4 mg Intravenous Given 9/5/23 0815)   insulin regular injection 10 Units 0.1 mL (10 Units Intravenous Given 9/5/23 0910)     Medical Decision Making  As per HPI.  Differential diagnosis:  Ureteral stone, lumbar back pain, back strain, UTI, pyelonephritis    Amount and/or Complexity of Data Reviewed  Labs: ordered.     Details: Elevated white blood cell count, elevated glucose, positive for hematuria.  All other labs are stable  Radiology: ordered.     Details: CT of the abdomen pelvis shows distal left-sided  ureteral stone per Radiology  Discussion of management or test interpretation with external provider(s): While in the ER, patient was given morphine, Toradol, Zofran and one L of normal saline.  Patient was then started on a normal saline infusion at 125 cc/hour.  Patient was also given a dose of insulin IV push secondary to hyperglycemia.  Patient got relief of left lower back pain.  CT shows distal left-sided stone.  Patient presented with a fever.  Patient was given Rocephin 1 g IV piggyback while in the ER.  Urology was consulted.  Patient will be admitted to the hospitalist team.    Risk  OTC drugs.  Prescription drug management.            Scribe Attestation:   Scribe #1: I performed the above scribed service and the documentation accurately describes the services I performed. I attest to the accuracy of the note.    Attending Attestation:           Physician Attestation for Scribe:  Physician Attestation Statement for Scribe #1: I, reviewed documentation, as scribed by Martinez Watkins in my presence, and it is both accurate and complete.             ED Course as of 09/05/23 0933   Tue Sep 05, 2023   0908 Patient remains in no apparent distress. [KG]   0911 On-call for Dr. Brando Dai was paged and hospitalist was also paged [KG]   0914 Dena NP,urology on-call, will see in consult, keep patient NPO [KG]   0924 Marva MITCHELL, OK to admit [KG]      ED Course User Index  [KG] Jai Koch MD               Medical Decision Making:   Clinical Tests:   Lab Tests: Ordered and Reviewed  Radiological Study: Ordered and Reviewed      Clinical Impression:   Final diagnoses:  [N20.1] Ureterolithiasis (Primary)  [R50.9] Fever, unspecified fever cause        ED Disposition Condition    Admit Stable                Jai Koch MD  09/05/23 0933

## 2023-09-06 VITALS
RESPIRATION RATE: 18 BRPM | WEIGHT: 251.31 LBS | DIASTOLIC BLOOD PRESSURE: 84 MMHG | HEIGHT: 73 IN | HEART RATE: 81 BPM | TEMPERATURE: 98 F | BODY MASS INDEX: 33.31 KG/M2 | OXYGEN SATURATION: 93 % | SYSTOLIC BLOOD PRESSURE: 141 MMHG

## 2023-09-06 PROBLEM — N13.30 HYDRONEPHROSIS: Status: ACTIVE | Noted: 2023-09-06

## 2023-09-06 LAB
ALBUMIN SERPL-MCNC: 3.3 G/DL (ref 3.4–4.8)
ALBUMIN/GLOB SERPL: 0.9 RATIO (ref 1.1–2)
ALP SERPL-CCNC: 69 UNIT/L (ref 40–150)
ALT SERPL-CCNC: 21 UNIT/L (ref 0–55)
AST SERPL-CCNC: 17 UNIT/L (ref 5–34)
BASOPHILS # BLD AUTO: 0.1 X10(3)/MCL
BASOPHILS NFR BLD AUTO: 1 %
BILIRUB SERPL-MCNC: 0.4 MG/DL
BUN SERPL-MCNC: 14.5 MG/DL (ref 8.4–25.7)
CALCIUM SERPL-MCNC: 9.4 MG/DL (ref 8.8–10)
CHLORIDE SERPL-SCNC: 106 MMOL/L (ref 98–107)
CO2 SERPL-SCNC: 24 MMOL/L (ref 23–31)
CREAT SERPL-MCNC: 0.9 MG/DL (ref 0.73–1.18)
EOSINOPHIL # BLD AUTO: 0.49 X10(3)/MCL (ref 0–0.9)
EOSINOPHIL NFR BLD AUTO: 4.8 %
ERYTHROCYTE [DISTWIDTH] IN BLOOD BY AUTOMATED COUNT: 14.2 % (ref 11.5–17)
GFR SERPLBLD CREATININE-BSD FMLA CKD-EPI: >60 MLS/MIN/1.73/M2
GLOBULIN SER-MCNC: 3.6 GM/DL (ref 2.4–3.5)
GLUCOSE SERPL-MCNC: 148 MG/DL (ref 82–115)
HCT VFR BLD AUTO: 41.5 % (ref 42–52)
HGB BLD-MCNC: 13.6 G/DL (ref 14–18)
IMM GRANULOCYTES # BLD AUTO: 0.02 X10(3)/MCL (ref 0–0.04)
IMM GRANULOCYTES NFR BLD AUTO: 0.2 %
LYMPHOCYTES # BLD AUTO: 4.66 X10(3)/MCL (ref 0.6–4.6)
LYMPHOCYTES NFR BLD AUTO: 45.6 %
MCH RBC QN AUTO: 27.5 PG (ref 27–31)
MCHC RBC AUTO-ENTMCNC: 32.8 G/DL (ref 33–36)
MCV RBC AUTO: 83.8 FL (ref 80–94)
MONOCYTES # BLD AUTO: 0.81 X10(3)/MCL (ref 0.1–1.3)
MONOCYTES NFR BLD AUTO: 7.9 %
NEUTROPHILS # BLD AUTO: 4.15 X10(3)/MCL (ref 2.1–9.2)
NEUTROPHILS NFR BLD AUTO: 40.5 %
NRBC BLD AUTO-RTO: 0 %
PLATELET # BLD AUTO: 270 X10(3)/MCL (ref 130–400)
PMV BLD AUTO: 8.7 FL (ref 7.4–10.4)
POCT GLUCOSE: 149 MG/DL (ref 70–110)
POCT GLUCOSE: 251 MG/DL (ref 70–110)
POTASSIUM SERPL-SCNC: 4.5 MMOL/L (ref 3.5–5.1)
PROT SERPL-MCNC: 6.9 GM/DL (ref 5.8–7.6)
RBC # BLD AUTO: 4.95 X10(6)/MCL (ref 4.7–6.1)
SODIUM SERPL-SCNC: 138 MMOL/L (ref 136–145)
WBC # SPEC AUTO: 10.23 X10(3)/MCL (ref 4.5–11.5)

## 2023-09-06 PROCEDURE — 25000003 PHARM REV CODE 250

## 2023-09-06 PROCEDURE — 80053 COMPREHEN METABOLIC PANEL: CPT | Performed by: NURSE PRACTITIONER

## 2023-09-06 PROCEDURE — 85025 COMPLETE CBC W/AUTO DIFF WBC: CPT | Performed by: NURSE PRACTITIONER

## 2023-09-06 RX ADMIN — LAMOTRIGINE 100 MG: 100 TABLET ORAL at 08:09

## 2023-09-06 RX ADMIN — OLANZAPINE 5 MG: 5 TABLET, FILM COATED ORAL at 08:09

## 2023-09-06 NOTE — PROGRESS NOTES
UROLOGY  PROGRESS  NOTE    Sam Strong IV 1950  [id number]  9/6/2023    Lying in bed, reports flank pian is resolved  No acute events or complaints  Leukocytosis resolved  VSS, afebrile  No urine output charted      Intake/Output:  No intake/output data recorded.  No intake/output data recorded.       Exam:    NAD  Card RRR  Resp unlabored   no urine for assessment      Recent Results (from the past 24 hour(s))   POCT glucose    Collection Time: 09/05/23  8:13 PM   Result Value Ref Range    POCT Glucose 203 (H) 70 - 110 mg/dL   Comprehensive Metabolic Panel    Collection Time: 09/06/23  4:25 AM   Result Value Ref Range    Sodium Level 138 136 - 145 mmol/L    Potassium Level 4.5 3.5 - 5.1 mmol/L    Chloride 106 98 - 107 mmol/L    Carbon Dioxide 24 23 - 31 mmol/L    Glucose Level 148 (H) 82 - 115 mg/dL    Blood Urea Nitrogen 14.5 8.4 - 25.7 mg/dL    Creatinine 0.90 0.73 - 1.18 mg/dL    Calcium Level Total 9.4 8.8 - 10.0 mg/dL    Protein Total 6.9 5.8 - 7.6 gm/dL    Albumin Level 3.3 (L) 3.4 - 4.8 g/dL    Globulin 3.6 (H) 2.4 - 3.5 gm/dL    Albumin/Globulin Ratio 0.9 (L) 1.1 - 2.0 ratio    Bilirubin Total 0.4 <=1.5 mg/dL    Alkaline Phosphatase 69 40 - 150 unit/L    Alanine Aminotransferase 21 0 - 55 unit/L    Aspartate Aminotransferase 17 5 - 34 unit/L    eGFR >60 mls/min/1.73/m2   CBC with Differential    Collection Time: 09/06/23  4:25 AM   Result Value Ref Range    WBC 10.23 4.50 - 11.50 x10(3)/mcL    RBC 4.95 4.70 - 6.10 x10(6)/mcL    Hgb 13.6 (L) 14.0 - 18.0 g/dL    Hct 41.5 (L) 42.0 - 52.0 %    MCV 83.8 80.0 - 94.0 fL    MCH 27.5 27.0 - 31.0 pg    MCHC 32.8 (L) 33.0 - 36.0 g/dL    RDW 14.2 11.5 - 17.0 %    Platelet 270 130 - 400 x10(3)/mcL    MPV 8.7 7.4 - 10.4 fL    Neut % 40.5 %    Lymph % 45.6 %    Mono % 7.9 %    Eos % 4.8 %    Basophil % 1.0 %    Lymph # 4.66 (H) 0.6 - 4.6 x10(3)/mcL    Neut # 4.15 2.1 - 9.2 x10(3)/mcL    Mono # 0.81 0.1 - 1.3 x10(3)/mcL    Eos # 0.49 0 - 0.9 x10(3)/mcL    Baso # 0.10  <=0.2 x10(3)/mcL    IG# 0.02 0 - 0.04 x10(3)/mcL    IG% 0.2 %    NRBC% 0.0 %   POCT glucose    Collection Time: 09/06/23  5:21 AM   Result Value Ref Range    POCT Glucose 149 (H) 70 - 110 mg/dL         Assessment:  4 mm left UVJ stone likely passed, no evidence of stone on KUB, pain completely resolved, labs improved and afebrile      Plan:  No indication for  surgical intervention. Ok to discharge from  standpoint. Patient to return to ER for fever, uncontrolled nausea, vomiting or pain. Keep appointment with Dr. Dai.    JOSE Dorado

## 2023-09-06 NOTE — NURSING
Nurses Note -- 4 Eyes      9/5/2023   10:51 PM      Skin assessed during: Admit      [x] No Altered Skin Integrity Present    []Prevention Measures Documented      [] Yes- Altered Skin Integrity Present or Discovered   [] LDA Added if Not in Epic (Describe Wound)   [] New Altered Skin Integrity was Present on Admit and Documented in LDA   [] Wound Image Taken    Wound Care Consulted? No    Attending Nurse:  Tamara Deleon RN/Staff Member:  Michell

## 2023-09-06 NOTE — PLAN OF CARE
09/06/23 0945   Final Note   Assessment Type Final Discharge Note   Anticipated Discharge Disposition Home   Post-Acute Status   Discharge Delays None known at this time

## 2023-09-06 NOTE — DISCHARGE SUMMARY
[facility] Medicine Discharge Summary    Admit Date: 9/5/2023  Discharge Date and Time: 9/6/20233:00 PM  Admitting Physician:  Team  Discharging Physician: Joi Alejandre DO.  Primary Care Physician: Jai Ren MD  Consults:  Urologist    Discharge Diagnoses:  Left UVJ obstructing ureterolithiasis  suspected urinary tract infection --culture negative  Acute kidney injury versus baseline CKD     Hospital Course:     73-year-old male with a past medical history of HLD, HTN, dm 2, bipolar disorder, mild cognitive impairment versus Alzheimer dementia.  Coming in with complaints of left lower quadrant abdominal pain/flank pain he did have fever when he came in but did not feel that he was febrile.  Nausea and vomiting once.  Urology was consulted and planning for ureteral stent today due to CT abdomen pelvis noting mm stone at the left UVJ producing left mild hydronephrosis and hydroureter.    Patient remained afebrile overnight.  No plans for utero stent placement.  Spoke with NP for NP Urology who felt that patient likely passed stone and no plans for cystoscopy, PATIENT WAS CLEARED FOR DISCHARGE WITH APPROPRIATE FOLLOW-UP WITH UROLOGIST OUTPATIENT.      Vitals:  VITAL SIGNS: 24 HRS MIN & MAX LAST   Temp  Min: 97.7 °F (36.5 °C)  Max: 98.3 °F (36.8 °C) 98.2 °F (36.8 °C)   BP  Min: 141/84  Max: 149/84 (!) 141/84   Pulse  Min: 77  Max: 84  81   Resp  Min: 17  Max: 19 18   SpO2  Min: 93 %  Max: 96 % (!) 93 %       Procedures Performed: No admission procedures for hospital encounter.     Significant Diagnostic Studies: See Full reports for all details    Recent Labs   Lab 09/05/23  0802 09/06/23  0425   WBC 14.86* 10.23   RBC 5.57 4.95   HGB 15.0 13.6*   HCT 45.1 41.5*   MCV 81.0 83.8   MCH 26.9* 27.5   MCHC 33.3 32.8*   RDW 13.7 14.2    270   MPV 8.7 8.7       Recent Labs   Lab 09/05/23  0802 09/06/23  0425    138   K 4.6 4.5   CO2 24 24   BUN 22.7 14.5    CREATININE 1.41* 0.90   CALCIUM 9.7 9.4   ALBUMIN 3.7 3.3*   ALKPHOS 104 69   ALT 28 21   AST 18 17   BILITOT 0.4 0.4        Microbiology Results (last 7 days)       Procedure Component Value Units Date/Time    Urine Culture High Risk [[id number]] Collected: 09/05/23 0812    Order Status: Completed Specimen: Urine, Clean Catch Updated: 09/06/23 0628     Urine Culture No Growth At 24 Hours    Urine Culture High Risk [[id number]]     Order Status: Canceled Specimen: Urine, Clean Catch              X-Ray Abdomen Flat And Erect  Narrative: EXAMINATION:  Three view abdominal series    CLINICAL HISTORY:  Possible passed stone    COMPARISON:  CT 09/05/2023    FINDINGS:  Left UVJ calculus seen on the CT is not readily identified by x-ray.  No dilated bowel loops.  Impression: No stone is identified in the left hemipelvis radiographically    Electronically signed by: Jose J Goddard MD  Date:    09/05/2023  Time:    11:30  CT Abdomen Pelvis  Without Contrast  Narrative: EXAMINATION:  CT ABDOMEN PELVIS WITHOUT CONTRAST    CLINICAL HISTORY:  Flank pain, kidney stone suspected;    TECHNIQUE:  CT imaging of the abdomen and pelvis without intravenous contrast. Axial, coronal and sagittal reformatted images reviewed. Dose length product is 632 mGycm. Automatic exposure control, adjustment of mA/kV or iterative reconstruction technique used to limit radiation dose.    COMPARISON:  CT 08/05/2022    FINDINGS:  Assessment of the visceral organs and vasculature is limited by the lack of IV contrast.    Liver/biliary: Hepatomegaly measures 20 cm.  Prior cholecystectomy. No biliary dilatation.    Pancreas: Normal.    Spleen: Normal.    Adrenals: Normal.    Genitourinary: 4 mm stone at the left UVJ.  It results in mild left hydronephrosis and hydroureter with left renal edema.  Few small nonobstructing renal stones bilaterally.  No collecting system dilatation on the right.  Several right renal cysts.  These measure up to 9 cm.  These  cysts need no dedicated imaging follow-up by consensus recommendation.  Bladder within normal limits.    Stomach/bowel: No bowel obstruction. Normal appendix. No discernible bowel inflammation.    Lymph nodes and peritoneum: No pathologically enlarged lymph node identified with noncontrast technique. No ascites or free air.    Vasculature: Mild aortic and iliac artery calcifications.  No abdominal aortic aneurysm.    Abdominal wall: Normal.    Lung bases: Stable mild left lung base scarring.    Bones: No acute osseous findings.  Impression: 4 mm stone at the left UVJ producing mild left hydronephrosis and hydroureter.    Electronically signed by: Eugene Sidhu  Date:    09/05/2023  Time:    08:50         Medication List        CONTINUE taking these medications      allopurinoL 300 MG tablet  Commonly known as: ZYLOPRIM  TAKE 1 TABLET BY MOUTH EVERY DAY     aspirin 81 MG EC tablet  Commonly known as: ECOTRIN     enalapril 10 MG tablet  Commonly known as: VASOTEC  TAKE 1 TABLET BY MOUTH EVERY DAY     folic acid 1 MG tablet  Commonly known as: FOLVITE     glipiZIDE 10 MG tablet  Commonly known as: GLUCOTROL     lamoTRIgine 100 MG tablet  Commonly known as: LAMICTAL     metFORMIN 500 MG tablet  Commonly known as: GLUCOPHAGE     OLANZapine 5 MG tablet  Commonly known as: ZyPREXA     rosuvastatin 20 MG tablet  Commonly known as: CRESTOR               Explained in detail to the patient about the discharge plan, medications, and follow-up visits. Pt understands and agrees with the treatment plan  Discharge Disposition: Home or Self Care   Discharged Condition: stable  Diet-    Medications Per DC med rec  Activities as tolerated    For further questions contact hospitalist office    Discharge time 33 minutes    For worsening symptoms, chest pain, shortness of breath, increased abdominal pain, high grade fever, stroke or stroke like symptoms, immediately go to the nearest Emergency Room or call 911 as soon as  possible.      Joi Gonzalez M.D, on 9/6/2023. at 3:00 PM.

## 2023-09-07 LAB — BACTERIA UR CULT: NO GROWTH

## 2023-09-10 LAB
BACTERIA BLD CULT: NORMAL
BACTERIA BLD CULT: NORMAL

## 2023-09-11 NOTE — PHYSICIAN QUERY
PT Name: Sam Strong IV  MR #: [id number]     DOCUMENTATION CLARIFICATION      CDS/: Monica Bowen RN CDI         Contact information: [email or web address]   This form is a permanent document in the medical record.    Query Date: September 11, 2023    By submitting this query, we are merely seeking further clarification of documentation to reflect the severity of illness of your patient. Please utilize your independent clinical judgment when addressing the question(s) below.     The Medical Record contains the following:   Indicators   Supporting Clinical Findings Location in Medical Record   X Urinalysis 09/05/23 08:12  Color, UA: Yellow  Appearance, UA: Clear  Occult Blood UA: 2+ !  NITRITE UA: Negative  Leukocytes, UA: Negative  RBC, UA: 21-50 !  WBC, UA: 0-5  Bacteria, UA: None Seen  Squam Epithel, UA: 0-4   Lab    X WBC 9/5  14.86  9/6  10.23   Lab      X Subjective/Objective Genitourinary Assessment Findings history of kidney stones presents to ED for L lower back pain onset months ago, worsening last night.  Pt also complains of nausea and an episode of vomiting    presented to the ED with complaints of worsening lower back pain.  Denies fever, chills, gross hematuria, dysuria, nausea or vomiting.  He did have a temp of 100.7° on arrival  UA with no WBC or bacteria. Abd/pelvic CT revealed a 4 mm stone at the left UVJ with mild left hydronephrosis and hydroureter.     4 mm left UVJ stone likely passed, no evidence of stone on KUB, pain completely resolved, labs improved and afebrile   No indication for  surgical intervention. ER MD 9/5 JAS Lewis MD      Urology consult 9/5 RODOLFO Gallegos NP            Urology 9/6 RODOLFO Reyna NP       X Radiology Findings CT ABD 9/5  624  4 mm stone at the left UVJ producing mild left hydronephrosis and hydroureter.    9/5  1155  No stone is identified in the left hemipelvis radiographically Radiology               X Culture 9/5 urine c/s no growth   Lab     X  Medication/Treatment Rocephin IV 9/5-6  NS 1000 ml IV  x 2 in ER   Lab     X Other Left UVJ obstructing ureterolithiasis  Acute complicated urinary tract infection  Discharge summary 9/6  MIYA Alejandre MD        Provider, please clarify the diagnosis related to the above documentation:  [   ] Complicated Urinary Tract Infection, ruled in, (please specify associated organism, if known: ____________)     [ x  ] Complicated Urinary Tract Infection ruled out.     [   ] Other diagnosis (please specify): ________________________   [   ] Clinically Undetermined       Please document in your progress notes daily for the duration of treatment until resolved, and include in your discharge summary.  Form No. [id number]

## 2023-09-11 NOTE — PHYSICIAN QUERY
PT Name: Sam Strong IV  MR #: [id number]     DOCUMENTATION CLARIFICATION     CDS/: Monica Bowen RN CDI          Contact information: [email or web address]   This form is a permanent document in the medical record.     Query Date: September 11, 2023    By submitting this query, we are merely seeking further clarification of documentation.  Please utilize your independent clinical judgment when addressing the question(s) below.  The Medical Record contains the following:  Indicators Supporting Clinical Findings Location in Medical Record   X HR         RR          BP        Temp           Temp       HR           RR       BP                        Sat  9/5     100.7            20      175/95 - 122/84      94  9/6     98.2        78-81        18      149/84                    93 Vitals     Lactic Acid          Procalcitonin     X WBC           Bands          CRP  WBC  9/5  14.86  9/6  10.23   Lab     X Culture(s) Urine c/s no growth    Blood c/s no growth   Labs 9/5    AMS, Confusion, LOC, etc.      Organ Dysfunction/Failure     X Bacteremia or Sepsis / Septic Left UVJ obstructing ureterolithiasis  Acute complicated urinary tract infection   Sepsis 2/2 above    Discharge summary  MIYA Alejandre MD     X Known or Suspected Source of Infection documented Left UVJ obstructing ureterolithiasis  Acute complicated urinary tract infection    Discharge summary  MIYA Alejandre MD    (Failed) Outpatient Treatment     X Medication Rocephin IV 9/5-6  NS 1000 ml IV  x 2 in ER     Med sheets      Treatment      Other          Provider, please specify diagnosis or diagnoses associated with above clinical findings.  [x   ] Sepsis ruled out.     [   ] Sepsis due to unknown organism ruled in, please specify clinical indicators supporting sepsis ______     [   ] Other Infectious Disease (please specify): __________     [  ] Clinically Undetermined         Please document in your progress notes daily for the duration of treatment  until resolved and include in your discharge summary.

## 2023-09-11 NOTE — PHYSICIAN QUERY
PT Name: Sam Strong IV  MR #: [id number]     DOCUMENTATION CLARIFICATION     CDS/: Monica Bowen RN CDI         Contact information: [email or web address]   This form is a permanent document in the medical record.    Query Date: September 11, 2023    By submitting this query, we are merely seeking further clarification of documentation.  Please utilize your independent clinical judgment when addressing the question(s) below.    The Medical Record contains the following:   Indicator Supporting Clinical Findings Location in Medical Record    Kidney (Renal) Insufficiency     X Kidney (Renal) Failure/Injury Acute kidney injury versus baseline CKD    Discharge summary 9/6 MIYA Alejandre MD      Nephrotoxic Agents     X BUN/Creatinine           GFR   9/5   22.7    1.41    52     9/6   14.5    0.90    >60   Lab      Urine: Casts         Eosinophils      Urine Output      Dehydration     X Nausea/Vomiting   Coming in with complaints of left lower quadrant abdominal pain/flank pain he did have fever when he came in but did not feel that he was febrile.  Nausea and vomiting once. H&P 9/5 RODOLFO Garibay MD      Dialysis/CRRT     X Treatment Urology consulted and planning for stent placement today.    Urine to send for culture.  Continue Rocephin 1 g daily. H&P 9/5 RODOLFO Garibay MD    Other         [facility] approved diagnostic criteria for acute kidney injury is based on KDIGO criteria:    An increase in serum creatinine > 0.3mg/dl within 48 hours  OR  Increase in serum creatinine to > 1.5x baseline, which is known or presumed to have occurred within the prior 7 days  OR  Urine volume <0.5 ml/kg/hr for 6 hours       The clinical guidelines noted above are only a system guideline. It does not replace the providers clinical judgment.     Provider, please specify the diagnosis or diagnoses associated with above clinical findings.     [ x   ] Unspecified Acute Kidney Failure/Injury     [    ] Other (please specify):  _______________________________   [  ] Clinically Undetermined       Please document in your progress notes daily for the duration of treatment until resolved and include in your discharge summary.    References:   [employer] Clinical Practice Guideline for Acute Kidney Injury. (2012, March). Retrieved October 21, 2020, from https://kdigo.org/wp-content/uploads/2016/10/GWJLY-7812-RZR-Guideline-English.pdf    DIPTI Wheeler MD, HONORIO Saha MD, & CHRISTY Segundo MD. (1960). Renal medullary necrosis [Abstract]. The American Journal of Medicine, 29(1), 132-156. Doi:https://www.sciencedirect.com/science/article/abs/pii/9713248146433711    CHRISTY Darby MD, & PHILLIP Fleming MD, MS. (2020, June 18). Definition and staging of chronic kidney disease in adults ([id number] HONORIO Abbott MD, ScD & [id number] MELVINA Jain MD, MSc, Eds.). Retrieved October 21, 2020, from https://www.[employer]/contents/definition-and-staging-of-chronic-kidney-disease-in-adults?search=ckd%20staging&source=search_result&selectedTitle=1~150&usage_type=default&display_rank=1     LYNN Zafar MD, FACP. (2015, Rosita 15). Acute kidney injury revisited. Retrieved October 21, 2020, from https://acphospitalist.org/archives/2015/06/coding-acute-kidney-injury.htm    FRANK Serrano MD. (2019, July). Renal Cortical Necrosis. Retrieved October 21, 2020, from https://www.[employer]/professional/genitourinary-disorders/renovascular-disorders/renal-cortical-necrosis    Form No. [id number]

## 2024-03-25 DIAGNOSIS — R41.3 MEMORY LOSS: Primary | ICD-10-CM

## 2024-05-21 ENCOUNTER — PATIENT OUTREACH (OUTPATIENT)
Dept: ADMINISTRATIVE | Facility: HOSPITAL | Age: 74
End: 2024-05-21
Payer: COMMERCIAL

## 2024-05-21 LAB — MICROALBUMIN/CREATININE RATIO: 618 UG/MG

## (undated) DEVICE — SYR 30CC LUER LOCK

## (undated) DEVICE — BAG DRAIN UROLOGY AND HOSE

## (undated) DEVICE — BOWL STERILE LARGE 32OZ

## (undated) DEVICE — SOL IRRIGATION WATER 3000ML

## (undated) DEVICE — SENSOR DUAL FLEX STR 150CM

## (undated) DEVICE — TRAY SKIN SCRUB WET PREMIUM

## (undated) DEVICE — POSITIONER HEAD ADULT

## (undated) DEVICE — SUPPORT ULNA NERVE PROTECTOR

## (undated) DEVICE — Device

## (undated) DEVICE — GLOVE PROTEXIS HYDROGEL SZ7.5

## (undated) DEVICE — ADAPTER STOPCOCK FEMALE LL

## (undated) DEVICE — KIT SURGICAL TURNOVER

## (undated) DEVICE — SOL IRRI STRL WATER 1000ML

## (undated) DEVICE — CATH POLLACK OPEN-END FLEXI-TI

## (undated) DEVICE — MARKER WRITESITE SKIN CHLRAPRP